# Patient Record
Sex: FEMALE | Race: ASIAN | NOT HISPANIC OR LATINO
[De-identification: names, ages, dates, MRNs, and addresses within clinical notes are randomized per-mention and may not be internally consistent; named-entity substitution may affect disease eponyms.]

---

## 2020-01-09 ENCOUNTER — APPOINTMENT (OUTPATIENT)
Dept: RHEUMATOLOGY | Facility: CLINIC | Age: 30
End: 2020-01-09
Payer: COMMERCIAL

## 2020-01-09 VITALS
WEIGHT: 159 LBS | DIASTOLIC BLOOD PRESSURE: 78 MMHG | BODY MASS INDEX: 34.3 KG/M2 | HEART RATE: 93 BPM | TEMPERATURE: 98.3 F | SYSTOLIC BLOOD PRESSURE: 118 MMHG | HEIGHT: 57 IN | OXYGEN SATURATION: 96 %

## 2020-01-09 DIAGNOSIS — Z83.3 FAMILY HISTORY OF DIABETES MELLITUS: ICD-10-CM

## 2020-01-09 PROBLEM — Z00.00 ENCOUNTER FOR PREVENTIVE HEALTH EXAMINATION: Status: ACTIVE | Noted: 2020-01-09

## 2020-01-09 PROCEDURE — 99213 OFFICE O/P EST LOW 20 MIN: CPT

## 2020-01-09 NOTE — HISTORY OF PRESENT ILLNESS
[FreeTextEntry1] : 29 year old woman with long standing history of lupus, lupus nephritis, followed by nephrology at [facility] \par Patient feeling well \par Last visit with nephrologist in November 2019, labs reviewed on phone\par No changes in medications at last visit.  \par \par Vitals reviewed and stable\par No recent infections, feeling well\par No rashes, no alopecia,\par Exercising, going to the gym about 3-4 times per week, near work\par Received flu vaccine at work\par No chest pain or shortness of breath\par no peripheral edema\par No Raynaud's\par Ophthalmology up to date, plaquenil 200 mg qday.

## 2020-01-09 NOTE — DATA REVIEWED
[FreeTextEntry1] : Reviewed on phone\par \par 12/3/19\par MONET 1:80\par DsDNA negative\par UA protein negative\par RBC 3\par Iron 92, ferritin 36\par Creatinine 0.41\par WBC: 7.3\par Hgb: 13.9, HCT: 41.3\par Platelet: 423

## 2020-01-09 NOTE — ASSESSMENT
[FreeTextEntry1] : 29 year old woman with longstanding history of lupus with lupus nephritis, followed by nephrologist at the [facility].  Patient currently doing well, recent lab results reviewed on patient's phone. Continue exercise as well.  Will continue plaquenil 200 mg qday, will continue to follow up with ophthalmology given long term Plaquenil use. Will request previous records as well.

## 2020-01-09 NOTE — PHYSICAL EXAM
[General Appearance - Alert] : alert [General Appearance - In No Acute Distress] : in no acute distress [General Appearance - Well Nourished] : well nourished [General Appearance - Well Developed] : well developed [Sclera] : the sclera and conjunctiva were normal [Outer Ear] : the ears and nose were normal in appearance [Examination Of The Oral Cavity] : the lips and gums were normal [Oropharynx] : the oropharynx was normal [Respiration, Rhythm And Depth] : normal respiratory rhythm and effort [Exaggerated Use Of Accessory Muscles For Inspiration] : no accessory muscle use [Heart Rate And Rhythm] : heart rate was normal and rhythm regular [Auscultation Breath Sounds / Voice Sounds] : lungs were clear to auscultation bilaterally [Heart Sounds] : normal S1 and S2 [Veins - Varicosity Changes] : there were no varicosital changes [Edema] : there was no peripheral edema [Abnormal Walk] : normal gait [Musculoskeletal - Swelling] : no joint swelling seen [Nail Clubbing] : no clubbing  or cyanosis of the fingernails [] : no rash [Skin Color & Pigmentation] : normal skin color and pigmentation [Motor Tone] : muscle strength and tone were normal [Oriented To Time, Place, And Person] : oriented to person, place, and time [Skin Lesions] : no skin lesions [Affect] : the affect was normal [Impaired Insight] : insight and judgment were intact [Mood] : the mood was normal

## 2020-04-28 ENCOUNTER — RX RENEWAL (OUTPATIENT)
Age: 30
End: 2020-04-28

## 2020-06-24 ENCOUNTER — APPOINTMENT (OUTPATIENT)
Dept: RHEUMATOLOGY | Facility: CLINIC | Age: 30
End: 2020-06-24
Payer: COMMERCIAL

## 2020-06-24 VITALS
HEIGHT: 57 IN | DIASTOLIC BLOOD PRESSURE: 84 MMHG | WEIGHT: 155.19 LBS | HEART RATE: 98 BPM | BODY MASS INDEX: 33.48 KG/M2 | TEMPERATURE: 98.5 F | OXYGEN SATURATION: 96 % | SYSTOLIC BLOOD PRESSURE: 129 MMHG

## 2020-06-24 PROCEDURE — 99213 OFFICE O/P EST LOW 20 MIN: CPT | Mod: 25

## 2020-06-24 PROCEDURE — 36415 COLL VENOUS BLD VENIPUNCTURE: CPT

## 2020-06-24 NOTE — HISTORY OF PRESENT ILLNESS
[FreeTextEntry1] : 30 year old woman with long standing history of lupus, lupus nephritis, followed by nephrology at the [facility] at [place] \par Patient feeling well \par Last visit with nephrologist rescheduled due to current coronavirus pandemic.  She has appointment scheduled for next month. \par No changes in medications since last visit.  \par \par Vitals reviewed and stable\par No recent infections, feeling well.  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

## 2020-06-24 NOTE — PHYSICAL EXAM
[General Appearance - Alert] : alert [General Appearance - In No Acute Distress] : in no acute distress [General Appearance - Well-Appearing] : healthy appearing [General Appearance - Well Developed] : well developed [Respiration, Rhythm And Depth] : normal respiratory rhythm and effort [Sclera] : the sclera and conjunctiva were normal [Exaggerated Use Of Accessory Muscles For Inspiration] : no accessory muscle use [Edema] : there was no peripheral edema [Veins - Varicosity Changes] : there were no varicosital changes [Nail Clubbing] : no clubbing  or cyanosis of the fingernails [Musculoskeletal - Swelling] : no joint swelling seen [Abnormal Walk] : normal gait [Skin Color & Pigmentation] : normal skin color and pigmentation [Skin Lesions] : no skin lesions [] : no rash [Oriented To Time, Place, And Person] : oriented to person, place, and time [Affect] : the affect was normal [Impaired Insight] : insight and judgment were intact

## 2020-06-24 NOTE — ASSESSMENT
[FreeTextEntry1] : 30 year old woman with longstanding history of lupus with lupus nephritis, followed by nephrologist at the [facility] at [facility].  Patient currently doing well, will follow up with nephrologist next month as rescheduled appointment due to current coronavirus pandemic.  Continue exercise as well.  Will continue plaquenil 200 mg qday, patient will make follow up with ophthalmology given long term Plaquenil use.  Check labs today.

## 2020-06-25 LAB
ALBUMIN SERPL ELPH-MCNC: 4.2 G/DL
ALP BLD-CCNC: 93 U/L
ALT SERPL-CCNC: 16 U/L
ANION GAP SERPL CALC-SCNC: 13 MMOL/L
AST SERPL-CCNC: 15 U/L
BASOPHILS # BLD AUTO: 0.07 K/UL
BASOPHILS NFR BLD AUTO: 0.9 %
BILIRUB SERPL-MCNC: 0.2 MG/DL
BUN SERPL-MCNC: 10 MG/DL
C3 SERPL-MCNC: 125 MG/DL
C4 SERPL-MCNC: 33 MG/DL
CALCIUM SERPL-MCNC: 9.9 MG/DL
CHLORIDE SERPL-SCNC: 104 MMOL/L
CO2 SERPL-SCNC: 23 MMOL/L
CREAT SERPL-MCNC: 0.47 MG/DL
EOSINOPHIL # BLD AUTO: 0.51 K/UL
EOSINOPHIL NFR BLD AUTO: 6.8 %
GLUCOSE SERPL-MCNC: 112 MG/DL
HCT VFR BLD CALC: 41.2 %
HGB BLD-MCNC: 12.5 G/DL
IMM GRANULOCYTES NFR BLD AUTO: 0.5 %
LYMPHOCYTES # BLD AUTO: 1.13 K/UL
LYMPHOCYTES NFR BLD AUTO: 15 %
MAN DIFF?: NORMAL
MCHC RBC-ENTMCNC: 26.9 PG
MCHC RBC-ENTMCNC: 30.3 GM/DL
MCV RBC AUTO: 88.8 FL
MONOCYTES # BLD AUTO: 0.88 K/UL
MONOCYTES NFR BLD AUTO: 11.7 %
NEUTROPHILS # BLD AUTO: 4.88 K/UL
NEUTROPHILS NFR BLD AUTO: 65.1 %
PLATELET # BLD AUTO: 471 K/UL
POTASSIUM SERPL-SCNC: 4.1 MMOL/L
PROT SERPL-MCNC: 6.9 G/DL
RBC # BLD: 4.64 M/UL
RBC # FLD: 14 %
SODIUM SERPL-SCNC: 140 MMOL/L
WBC # FLD AUTO: 7.51 K/UL

## 2020-06-26 ENCOUNTER — NON-APPOINTMENT (OUTPATIENT)
Age: 30
End: 2020-06-26

## 2020-06-29 LAB — DSDNA AB SER-ACNC: 88 IU/ML

## 2020-12-16 ENCOUNTER — APPOINTMENT (OUTPATIENT)
Dept: RHEUMATOLOGY | Facility: CLINIC | Age: 30
End: 2020-12-16
Payer: COMMERCIAL

## 2020-12-16 VITALS
TEMPERATURE: 98.3 F | OXYGEN SATURATION: 100 % | HEIGHT: 57 IN | DIASTOLIC BLOOD PRESSURE: 84 MMHG | SYSTOLIC BLOOD PRESSURE: 127 MMHG | WEIGHT: 155 LBS | HEART RATE: 84 BPM | BODY MASS INDEX: 33.44 KG/M2

## 2020-12-16 PROCEDURE — 99072 ADDL SUPL MATRL&STAF TM PHE: CPT

## 2020-12-16 PROCEDURE — 36415 COLL VENOUS BLD VENIPUNCTURE: CPT

## 2020-12-16 PROCEDURE — 99213 OFFICE O/P EST LOW 20 MIN: CPT | Mod: 25

## 2020-12-16 NOTE — ASSESSMENT
[FreeTextEntry1] : 30 year old woman with longstanding history of lupus with lupus nephritis, followed by nephrologist at the [facility] at [facility].  Patient currently doing well, will follow up with nephrologist February 2021.  Blood pressure controlled, continue losartan 50 mg qday.  Will check labs today.  Patient will continue plaquenil 200 mg qday and will make follow up with ophthalmology given long term Plaquenil use.  Will increase exercise as well.

## 2020-12-16 NOTE — PHYSICAL EXAM
[General Appearance - Alert] : alert [General Appearance - In No Acute Distress] : in no acute distress [General Appearance - Well-Appearing] : healthy appearing [Sclera] : the sclera and conjunctiva were normal [Respiration, Rhythm And Depth] : normal respiratory rhythm and effort [Exaggerated Use Of Accessory Muscles For Inspiration] : no accessory muscle use [Heart Rate And Rhythm] : heart rate was normal and rhythm regular [Heart Sounds] : normal S1 and S2 [Edema] : there was no peripheral edema [Skin Color & Pigmentation] : normal skin color and pigmentation [] : no rash [Skin Lesions] : no skin lesions [Oriented To Time, Place, And Person] : oriented to person, place, and time [Impaired Insight] : insight and judgment were intact [Affect] : the affect was normal [Mood] : the mood was normal [FreeTextEntry1] : No Raynauds

## 2020-12-16 NOTE — HISTORY OF PRESENT ILLNESS
[FreeTextEntry1] : 30 year old woman with long standing history of lupus, lupus nephritis, followed by nephrology at the [facility] at [place] \par Patient feeling well \par Last visit with nephrologist rescheduled due to current coronavirus pandemic.  She has appointment scheduled for next month. \par No changes in medications since last visit.  \par \par Vitals reviewed and stable\par No recent infections, feeling well.  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.\par No rashes, no alopecia.\par Exercising at home, walking for exercise but limited outside due to concern for risk of infection.\par \par No chest pain or shortness of breath\par no peripheral edema\par No Raynaud's\par Ophthalmology 11/18, will schedule follow up when current conditions allow, \par Continues plaquenil 200 mg qday, did not have issues with obtaining medication, has not missed doses.\par \par December 16, 2020\par Patient returns for follow up \par Patient feeling well\par No new symptoms\par Takes Vit D, C, iron\par No changes in medications since last visit\par No chest pain, shortness of breath, no peripheral edema\par No rashes, no Raynaud's\par No recent infections\par Not walking as much but will try to restart\par Walking less at work due wearing the mask

## 2020-12-21 LAB
C3 SERPL-MCNC: 145 MG/DL
C4 SERPL-MCNC: 36 MG/DL

## 2020-12-22 ENCOUNTER — TRANSCRIPTION ENCOUNTER (OUTPATIENT)
Age: 30
End: 2020-12-22

## 2020-12-22 LAB
ALBUMIN SERPL ELPH-MCNC: 4.7 G/DL
ALP BLD-CCNC: 89 U/L
ALT SERPL-CCNC: 18 U/L
ANION GAP SERPL CALC-SCNC: 12 MMOL/L
APPEARANCE: CLEAR
AST SERPL-CCNC: 14 U/L
BACTERIA: NEGATIVE
BASOPHILS # BLD AUTO: 0.07 K/UL
BASOPHILS NFR BLD AUTO: 1 %
BILIRUB SERPL-MCNC: 0.2 MG/DL
BILIRUBIN URINE: NEGATIVE
BLOOD URINE: ABNORMAL
BUN SERPL-MCNC: 9 MG/DL
CALCIUM SERPL-MCNC: 10.8 MG/DL
CHLORIDE SERPL-SCNC: 105 MMOL/L
CO2 SERPL-SCNC: 24 MMOL/L
COLOR: COLORLESS
CREAT SERPL-MCNC: 0.51 MG/DL
CREAT SPEC-SCNC: 7 MG/DL
CREAT/PROT UR: 2 RATIO
DSDNA AB SER-ACNC: 109 IU/ML
EOSINOPHIL # BLD AUTO: 0.54 K/UL
EOSINOPHIL NFR BLD AUTO: 7.4 %
GLUCOSE QUALITATIVE U: NEGATIVE
GLUCOSE SERPL-MCNC: 84 MG/DL
HCT VFR BLD CALC: 42.4 %
HGB BLD-MCNC: 13.3 G/DL
HYALINE CASTS: 1 /LPF
IMM GRANULOCYTES NFR BLD AUTO: 0.4 %
KETONES URINE: NEGATIVE
LEUKOCYTE ESTERASE URINE: ABNORMAL
LYMPHOCYTES # BLD AUTO: 1.15 K/UL
LYMPHOCYTES NFR BLD AUTO: 15.8 %
MAN DIFF?: NORMAL
MCHC RBC-ENTMCNC: 26.8 PG
MCHC RBC-ENTMCNC: 31.4 GM/DL
MCV RBC AUTO: 85.3 FL
MICROSCOPIC-UA: NORMAL
MONOCYTES # BLD AUTO: 1.06 K/UL
MONOCYTES NFR BLD AUTO: 14.6 %
NEUTROPHILS # BLD AUTO: 4.43 K/UL
NEUTROPHILS NFR BLD AUTO: 60.8 %
NITRITE URINE: NEGATIVE
PH URINE: 7.5
PLATELET # BLD AUTO: 500 K/UL
POTASSIUM SERPL-SCNC: 4.6 MMOL/L
PROT SERPL-MCNC: 7.7 G/DL
PROT UR-MCNC: 15 MG/DL
PROTEIN URINE: NEGATIVE
RBC # BLD: 4.97 M/UL
RBC # FLD: 13.7 %
RED BLOOD CELLS URINE: 12 /HPF
SODIUM SERPL-SCNC: 141 MMOL/L
SPECIFIC GRAVITY URINE: 1
SQUAMOUS EPITHELIAL CELLS: 0 /HPF
URINE COMMENTS: NORMAL
UROBILINOGEN URINE: NORMAL
WBC # FLD AUTO: 7.28 K/UL
WHITE BLOOD CELLS URINE: 75 /HPF

## 2021-01-05 ENCOUNTER — RX RENEWAL (OUTPATIENT)
Age: 31
End: 2021-01-05

## 2021-06-16 ENCOUNTER — APPOINTMENT (OUTPATIENT)
Dept: RHEUMATOLOGY | Facility: CLINIC | Age: 31
End: 2021-06-16

## 2021-06-17 ENCOUNTER — LABORATORY RESULT (OUTPATIENT)
Age: 31
End: 2021-06-17

## 2021-06-17 ENCOUNTER — APPOINTMENT (OUTPATIENT)
Dept: RHEUMATOLOGY | Facility: CLINIC | Age: 31
End: 2021-06-17
Payer: COMMERCIAL

## 2021-06-17 VITALS
BODY MASS INDEX: 33.76 KG/M2 | HEIGHT: 57 IN | HEART RATE: 93 BPM | WEIGHT: 156.5 LBS | SYSTOLIC BLOOD PRESSURE: 126 MMHG | TEMPERATURE: 98.2 F | OXYGEN SATURATION: 100 % | DIASTOLIC BLOOD PRESSURE: 80 MMHG

## 2021-06-17 PROCEDURE — 99072 ADDL SUPL MATRL&STAF TM PHE: CPT

## 2021-06-17 PROCEDURE — 99214 OFFICE O/P EST MOD 30 MIN: CPT | Mod: 25

## 2021-06-17 PROCEDURE — 36415 COLL VENOUS BLD VENIPUNCTURE: CPT

## 2021-06-17 NOTE — PHYSICAL EXAM
[General Appearance - Alert] : alert [General Appearance - In No Acute Distress] : in no acute distress [General Appearance - Well-Appearing] : healthy appearing [Sclera] : the sclera and conjunctiva were normal [Respiration, Rhythm And Depth] : normal respiratory rhythm and effort [Exaggerated Use Of Accessory Muscles For Inspiration] : no accessory muscle use [Edema] : there was no peripheral edema [Nail Clubbing] : no clubbing  or cyanosis of the fingernails [Abnormal Walk] : normal gait [Musculoskeletal - Swelling] : no joint swelling seen [Motor Tone] : muscle strength and tone were normal [Skin Color & Pigmentation] : normal skin color and pigmentation [Skin Turgor] : normal skin turgor [] : no rash [Skin Lesions] : no skin lesions [Oriented To Time, Place, And Person] : oriented to person, place, and time [Impaired Insight] : insight and judgment were intact [Affect] : the affect was normal [Mood] : the mood was normal [FreeTextEntry1] : No active synovitis of the upper and lower extremities bilaterally.

## 2021-06-17 NOTE — ASSESSMENT
[FreeTextEntry1] : 31 year old woman with longstanding history of lupus with lupus nephritis, followed by nephrologist at the [facility] at [facility].  Patient currently doing well, will follow up with nephrologist August 2021.  Blood pressure controlled, continue losartan 50 mg qday.  Will check labs today in office.  Patient will continue plaquenil 200 mg qday and is up to date on ophthalmology follow up, last visit May 202 1, will follow up with ophthalmology given long term Plaquenil use.  Sunscreen use discussed in addition to increasing exercise as well.  Will check vitamin d level today.

## 2021-06-17 NOTE — HISTORY OF PRESENT ILLNESS
[FreeTextEntry1] : 31 year old woman with long standing history of lupus, lupus nephritis, followed by nephrology at the [facility] at [place] \par Patient feeling well \par Last visit with nephrologist rescheduled due to current coronavirus pandemic.  She has appointment scheduled for next month. \par No changes in medications since last visit.  \par \par Vitals reviewed and stable\par No recent infections, feeling well.  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.\par No rashes, no alopecia.\par Exercising at home, walking for exercise but limited outside due to concern for risk of infection.\par \par No chest pain or shortness of breath\par no peripheral edema\par No Raynaud's\par Ophthalmology 11/18, will schedule follow up when current conditions allow, \par Continues plaquenil 200 mg qday, did not have issues with obtaining medication, has not missed doses.\par \par December 16, 2020\par Patient returns for follow up \par Patient feeling well\par No new symptoms\par Takes Vit D, C, iron\par No changes in medications since last visit\par No chest pain, shortness of breath, no peripheral edema\par No rashes, no Raynaud's\par No recent infections\par Not walking as much but will try to restart\par Walking less at work due wearing the mask\par \par June 17, 2021\par Patient feeling well\par Completed covid vaccine x 2, Moderna, March 24, 2021\par Parents completed vaccine as well in April 2021\par Had follow up with ophthalmology May 2021, no effects of plaquenil noted\par No changes in medications since last visit, overall feeling well\par Still exercising, walking, and you tube videos\par No chest pain or shortness of breath\par No joint pains\par No rashes\par +sunscreen use\par No Raynauds\par Taking vitamins C, D, and Iron as well\par No peripheral edema \par

## 2021-06-18 LAB
25(OH)D3 SERPL-MCNC: 21.2 NG/ML
ALBUMIN SERPL ELPH-MCNC: 4.3 G/DL
ALP BLD-CCNC: 78 U/L
ALT SERPL-CCNC: 13 U/L
ANION GAP SERPL CALC-SCNC: 11 MMOL/L
APPEARANCE: CLEAR
AST SERPL-CCNC: 15 U/L
BASOPHILS # BLD AUTO: 0.08 K/UL
BASOPHILS NFR BLD AUTO: 1 %
BILIRUB SERPL-MCNC: 0.2 MG/DL
BILIRUBIN URINE: NEGATIVE
BLOOD URINE: ABNORMAL
BUN SERPL-MCNC: 11 MG/DL
C3 SERPL-MCNC: 138 MG/DL
C4 SERPL-MCNC: 37 MG/DL
CALCIUM SERPL-MCNC: 9.8 MG/DL
CHLORIDE SERPL-SCNC: 104 MMOL/L
CO2 SERPL-SCNC: 25 MMOL/L
COLOR: COLORLESS
CREAT SERPL-MCNC: 0.47 MG/DL
CREAT SPEC-SCNC: 20 MG/DL
CREAT/PROT UR: 1.5 RATIO
EOSINOPHIL # BLD AUTO: 0.46 K/UL
EOSINOPHIL NFR BLD AUTO: 5.6 %
GLUCOSE QUALITATIVE U: NEGATIVE
GLUCOSE SERPL-MCNC: 110 MG/DL
HCT VFR BLD CALC: 39.8 %
HGB BLD-MCNC: 12.1 G/DL
IMM GRANULOCYTES NFR BLD AUTO: 1.5 %
KETONES URINE: NEGATIVE
LEUKOCYTE ESTERASE URINE: ABNORMAL
LYMPHOCYTES # BLD AUTO: 1.06 K/UL
LYMPHOCYTES NFR BLD AUTO: 12.8 %
MAN DIFF?: NORMAL
MCHC RBC-ENTMCNC: 27.5 PG
MCHC RBC-ENTMCNC: 30.4 GM/DL
MCV RBC AUTO: 90.5 FL
MONOCYTES # BLD AUTO: 0.92 K/UL
MONOCYTES NFR BLD AUTO: 11.1 %
NEUTROPHILS # BLD AUTO: 5.63 K/UL
NEUTROPHILS NFR BLD AUTO: 68 %
NITRITE URINE: NEGATIVE
PH URINE: 7
PLATELET # BLD AUTO: 513 K/UL
POTASSIUM SERPL-SCNC: 4.3 MMOL/L
PROT SERPL-MCNC: 7.1 G/DL
PROT UR-MCNC: 30 MG/DL
PROTEIN URINE: ABNORMAL
RBC # BLD: 4.4 M/UL
RBC # FLD: 14.4 %
SODIUM SERPL-SCNC: 140 MMOL/L
SPECIFIC GRAVITY URINE: 1.01
UROBILINOGEN URINE: NORMAL
WBC # FLD AUTO: 8.27 K/UL

## 2021-06-21 LAB — DSDNA AB SER-ACNC: 115 IU/ML

## 2021-12-16 ENCOUNTER — LABORATORY RESULT (OUTPATIENT)
Age: 31
End: 2021-12-16

## 2021-12-16 ENCOUNTER — APPOINTMENT (OUTPATIENT)
Dept: RHEUMATOLOGY | Facility: CLINIC | Age: 31
End: 2021-12-16
Payer: COMMERCIAL

## 2021-12-16 VITALS
TEMPERATURE: 98.3 F | HEIGHT: 57 IN | WEIGHT: 153 LBS | HEART RATE: 105 BPM | DIASTOLIC BLOOD PRESSURE: 87 MMHG | OXYGEN SATURATION: 99 % | SYSTOLIC BLOOD PRESSURE: 130 MMHG | BODY MASS INDEX: 33.01 KG/M2

## 2021-12-16 DIAGNOSIS — Z13.220 ENCOUNTER FOR SCREENING FOR LIPOID DISORDERS: ICD-10-CM

## 2021-12-16 PROCEDURE — 99214 OFFICE O/P EST MOD 30 MIN: CPT | Mod: 25

## 2021-12-16 PROCEDURE — 36415 COLL VENOUS BLD VENIPUNCTURE: CPT

## 2021-12-16 NOTE — HISTORY OF PRESENT ILLNESS
[FreeTextEntry1] : 31 year old woman with long standing history of lupus, lupus nephritis, followed by nephrology at the [facility] at [place] \par \par June 24, 2020\par Patient feeling well \par Last visit with nephrologist rescheduled due to current coronavirus pandemic.  She has appointment scheduled for next month. \par No changes in medications since last visit.  \par \par Vitals reviewed and stable\par No recent infections, feeling well.  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.\par No rashes, no alopecia.\par Exercising at home, walking for exercise but limited outside due to concern for risk of infection.\par \par No chest pain or shortness of breath\par no peripheral edema\par No Raynaud's\par Ophthalmology 11/18, will schedule follow up when current conditions allow, \par Continues plaquenil 200 mg qday, did not have issues with obtaining medication, has not missed doses.\par \par December 16, 2020\par Patient returns for follow up \par Patient feeling well\par No new symptoms\par Takes Vit D, C, iron\par No changes in medications since last visit\par No chest pain, shortness of breath, no peripheral edema\par No rashes, no Raynaud's\par No recent infections\par Not walking as much but will try to restart\par Walking less at work due wearing the mask\par \par June 17, 2021\par Patient feeling well\par Completed covid vaccine x 2, Moderna, March 24, 2021\par Parents completed vaccine as well in April 2021\par Had follow up with ophthalmology May 2021, no effects of plaquenil noted\par No changes in medications since last visit, overall feeling well\par Still exercising, walking, and you tube videos\par No chest pain or shortness of breath\par No joint pains\par No rashes\par +sunscreen use\par No Raynauds\par Taking vitamins C, D, and Iron as well\par No peripheral edema \par \par December 16, 2021\par Patient returns for follow up, feeling well\par Completed flu shot vaccine\par Completed covid vaccine x 2 doses in march 2021, discussed covid booster vaccine\par Completed follow up ophthalmology June 6, 2021\par Taking Vitamin C, D, iron supplement\par Doubled the dose of vitamin d as low at last visit, vitamin d 2000 units per day\par No joint pains or swelling, no stiffness\par No peripheral edema\par No rashes, no oral ulcers\par Overall feeling well, will have follow up with nephrologist in February

## 2021-12-16 NOTE — ASSESSMENT
[FreeTextEntry1] : 31 year old woman with longstanding history of lupus with lupus nephritis, followed by nephrologist at the [facility] at [facility].  Patient currently doing well, will follow up with nephrologist February 2022.  Blood pressure controlled, continue losartan 50 mg qday. Will check labs today in office.  Patient will continue plaquenil 200 mg qday and is up to date on ophthalmology follow up, last visit June 2021,will continue to follow up with ophthalmology as recommended given long term Plaquenil use. Continues increased dose of vitamin D since last visit as well.  Patient will obtain covid booster at work as well. Patient will follow up with nephrologist in February 2022.

## 2021-12-16 NOTE — PHYSICAL EXAM
[General Appearance - Alert] : alert [General Appearance - In No Acute Distress] : in no acute distress [General Appearance - Well Nourished] : well nourished [General Appearance - Well Developed] : well developed [General Appearance - Well-Appearing] : healthy appearing [Sclera] : the sclera and conjunctiva were normal [Respiration, Rhythm And Depth] : normal respiratory rhythm and effort [Exaggerated Use Of Accessory Muscles For Inspiration] : no accessory muscle use [Edema] : there was no peripheral edema [No Spinal Tenderness] : no spinal tenderness [Abnormal Walk] : normal gait [Nail Clubbing] : no clubbing  or cyanosis of the fingernails [Musculoskeletal - Swelling] : no joint swelling seen [Motor Tone] : muscle strength and tone were normal [] : no rash [Skin Lesions] : no skin lesions [Oriented To Time, Place, And Person] : oriented to person, place, and time [Impaired Insight] : insight and judgment were intact [Affect] : the affect was normal [Mood] : the mood was normal [FreeTextEntry1] : No active synovitis of the upper and lower extremities bilaterally.

## 2021-12-17 ENCOUNTER — NON-APPOINTMENT (OUTPATIENT)
Age: 31
End: 2021-12-17

## 2021-12-17 LAB
ALBUMIN SERPL ELPH-MCNC: 4.4 G/DL
ALP BLD-CCNC: 90 U/L
ALT SERPL-CCNC: 14 U/L
ANION GAP SERPL CALC-SCNC: 15 MMOL/L
APPEARANCE: CLEAR
AST SERPL-CCNC: 12 U/L
BASOPHILS # BLD AUTO: 0.09 K/UL
BASOPHILS NFR BLD AUTO: 0.9 %
BILIRUB SERPL-MCNC: 0.2 MG/DL
BILIRUBIN URINE: NEGATIVE
BLOOD URINE: ABNORMAL
BUN SERPL-MCNC: 10 MG/DL
C3 SERPL-MCNC: 160 MG/DL
C4 SERPL-MCNC: 44 MG/DL
CALCIUM SERPL-MCNC: 9.7 MG/DL
CHLORIDE SERPL-SCNC: 104 MMOL/L
CHOLEST SERPL-MCNC: 222 MG/DL
CO2 SERPL-SCNC: 22 MMOL/L
COLOR: COLORLESS
CREAT SERPL-MCNC: 0.49 MG/DL
CREAT SPEC-SCNC: 12 MG/DL
CREAT/PROT UR: 1.1 RATIO
EOSINOPHIL # BLD AUTO: 0.57 K/UL
EOSINOPHIL NFR BLD AUTO: 5.9 %
GLUCOSE QUALITATIVE U: NEGATIVE
GLUCOSE SERPL-MCNC: 78 MG/DL
HCT VFR BLD CALC: 34.1 %
HDLC SERPL-MCNC: 54 MG/DL
HGB BLD-MCNC: 10.2 G/DL
IMM GRANULOCYTES NFR BLD AUTO: 1.3 %
KETONES URINE: NEGATIVE
LDLC SERPL CALC-MCNC: 137 MG/DL
LEUKOCYTE ESTERASE URINE: ABNORMAL
LYMPHOCYTES # BLD AUTO: 0.98 K/UL
LYMPHOCYTES NFR BLD AUTO: 10.1 %
MAN DIFF?: NORMAL
MCHC RBC-ENTMCNC: 26 PG
MCHC RBC-ENTMCNC: 29.9 GM/DL
MCV RBC AUTO: 87 FL
MONOCYTES # BLD AUTO: 0.85 K/UL
MONOCYTES NFR BLD AUTO: 8.8 %
NEUTROPHILS # BLD AUTO: 7.04 K/UL
NEUTROPHILS NFR BLD AUTO: 73 %
NITRITE URINE: NEGATIVE
NONHDLC SERPL-MCNC: 168 MG/DL
PH URINE: 7
PLATELET # BLD AUTO: 632 K/UL
POTASSIUM SERPL-SCNC: 4.3 MMOL/L
PROT SERPL-MCNC: 7 G/DL
PROT UR-MCNC: 14 MG/DL
PROTEIN URINE: NORMAL
RBC # BLD: 3.92 M/UL
RBC # FLD: 14.8 %
SODIUM SERPL-SCNC: 141 MMOL/L
SPECIFIC GRAVITY URINE: 1
TRIGL SERPL-MCNC: 152 MG/DL
UROBILINOGEN URINE: NORMAL
WBC # FLD AUTO: 9.66 K/UL

## 2021-12-21 LAB — DSDNA AB SER-ACNC: 54 IU/ML

## 2022-04-12 ENCOUNTER — NON-APPOINTMENT (OUTPATIENT)
Age: 32
End: 2022-04-12

## 2022-06-16 ENCOUNTER — LABORATORY RESULT (OUTPATIENT)
Age: 32
End: 2022-06-16

## 2022-06-16 ENCOUNTER — APPOINTMENT (OUTPATIENT)
Dept: RHEUMATOLOGY | Facility: CLINIC | Age: 32
End: 2022-06-16
Payer: COMMERCIAL

## 2022-06-16 VITALS
DIASTOLIC BLOOD PRESSURE: 82 MMHG | BODY MASS INDEX: 32.63 KG/M2 | HEART RATE: 96 BPM | OXYGEN SATURATION: 97 % | TEMPERATURE: 98.2 F | SYSTOLIC BLOOD PRESSURE: 122 MMHG | WEIGHT: 151.25 LBS | HEIGHT: 57 IN

## 2022-06-16 DIAGNOSIS — C54.1 MALIGNANT NEOPLASM OF ENDOMETRIUM: ICD-10-CM

## 2022-06-16 PROCEDURE — 99214 OFFICE O/P EST MOD 30 MIN: CPT | Mod: 25

## 2022-06-16 RX ORDER — PROGESTERONE 200 MG/1
200 CAPSULE ORAL
Qty: 30 | Refills: 0 | Status: COMPLETED | COMMUNITY
Start: 2022-03-05

## 2022-06-16 NOTE — ASSESSMENT
[FreeTextEntry1] : 32 year old woman with longstanding history of lupus with lupus nephritis, followed by nephrologist at the [facility], follow up pending August 2022.  Patient currently doing well.  Since last visit, patient diagnosed with endometrial carcinoma, following at [facility]. Blood pressure well controlled, continue losartan 50 mg qday. Will check labs today in office. Patient will continue plaquenil 200 mg qday, up to date with ophthalmology follow up, continue close follow up with ophthalmology as recommended given long term Plaquenil use.  Patient will follow up in 6 months or sooner as needed

## 2022-06-16 NOTE — PHYSICAL EXAM
[General Appearance - Alert] : alert [General Appearance - In No Acute Distress] : in no acute distress [General Appearance - Well Nourished] : well nourished [General Appearance - Well Developed] : well developed [General Appearance - Well-Appearing] : healthy appearing [Sclera] : the sclera and conjunctiva were normal [Examination Of The Oral Cavity] : the lips and gums were normal [Oropharynx] : the oropharynx was normal [Respiration, Rhythm And Depth] : normal respiratory rhythm and effort [Exaggerated Use Of Accessory Muscles For Inspiration] : no accessory muscle use [Auscultation Breath Sounds / Voice Sounds] : lungs were clear to auscultation bilaterally [Heart Rate And Rhythm] : heart rate was normal and rhythm regular [Heart Sounds] : normal S1 and S2 [Edema] : there was no peripheral edema [Abnormal Walk] : normal gait [Nail Clubbing] : no clubbing  or cyanosis of the fingernails [Musculoskeletal - Swelling] : no joint swelling seen [Motor Tone] : muscle strength and tone were normal [] : no rash [Skin Lesions] : no skin lesions [Oriented To Time, Place, And Person] : oriented to person, place, and time [Impaired Insight] : insight and judgment were intact [Affect] : the affect was normal [Mood] : the mood was normal [FreeTextEntry1] : No active synovitis.  Muscle strength intact in all 4 extremities

## 2022-06-16 NOTE — DATA REVIEWED
[FreeTextEntry1] : MRI report from April 27, 2022 reviewed with patient:\par Enhancement throughout the endometrium, compatible with known endometrial neoplasm no visible gross myometrial invasion.\par \par Pathology from March 29, 2022:\par Endometrial carcinoma grade 1 with squamous differentiation

## 2022-06-16 NOTE — HISTORY OF PRESENT ILLNESS
[FreeTextEntry1] : 32 year old woman with long standing history of lupus, lupus nephritis, followed by nephrology at the [facility] at [place] \par \par June 24, 2020\par Patient feeling well \par Last visit with nephrologist rescheduled due to current coronavirus pandemic.  She has appointment scheduled for next month. \par No changes in medications since last visit.  \par \par Vitals reviewed and stable\par No recent infections, feeling well.  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.\par No rashes, no alopecia.\par Exercising at home, walking for exercise but limited outside due to concern for risk of infection.\par \par No chest pain or shortness of breath\par no peripheral edema\par No Raynaud's\par Ophthalmology 11/18, will schedule follow up when current conditions allow, \par Continues plaquenil 200 mg qday, did not have issues with obtaining medication, has not missed doses.\par \par December 16, 2020\par Patient returns for follow up \par Patient feeling well\par No new symptoms\par Takes Vit D, C, iron\par No changes in medications since last visit\par No chest pain, shortness of breath, no peripheral edema\par No rashes, no Raynaud's\par No recent infections\par Not walking as much but will try to restart\par Walking less at work due wearing the mask\par \par June 17, 2021\par Patient feeling well\par Completed covid vaccine x 2, Moderna, March 24, 2021\par Parents completed vaccine as well in April 2021\par Had follow up with ophthalmology May 2021, no effects of plaquenil noted\par No changes in medications since last visit, overall feeling well\par Still exercising, walking, and you tube videos\par No chest pain or shortness of breath\par No joint pains\par No rashes\par +sunscreen use\par No Raynauds\par Taking vitamins C, D, and Iron as well\par No peripheral edema \par \par December 16, 2021\par Patient returns for follow up, feeling well\par Completed flu shot vaccine\par Completed covid vaccine x 2 doses in march 2021, discussed covid booster vaccine\par Completed follow up ophthalmology June 6, 2021\par Taking Vitamin C, D, iron supplement\par Doubled the dose of vitamin d as low at last visit, vitamin d 2000 units per day\par No joint pains or swelling, no stiffness\par No peripheral edema\par No rashes, no oral ulcers\par Overall feeling well, will have follow up with nephrologist in February\par \par June 16, 2022\par Patient returns for follow up\par Since last visit, patient has been diagnosed with endometrial carcinoma grade 1 following with gynecology oncology at [facility].\par Had IUD placed two days ago, progestin only, donn\par Following every six months with GYN oncology\par Was seen by nephrologist in February 2022, no changes in medications, has follow-up in August\par Feeling well, no joint pains or swelling, no stiffness, no peripheral edema, no rashes, no oral ulcers,\par No chest pain or shortness of breath.\par \par

## 2022-06-17 ENCOUNTER — NON-APPOINTMENT (OUTPATIENT)
Age: 32
End: 2022-06-17

## 2022-06-17 ENCOUNTER — LABORATORY RESULT (OUTPATIENT)
Age: 32
End: 2022-06-17

## 2022-06-17 LAB
ALBUMIN SERPL ELPH-MCNC: 4.6 G/DL
ALP BLD-CCNC: 97 U/L
ALT SERPL-CCNC: 16 U/L
ANION GAP SERPL CALC-SCNC: 13 MMOL/L
APPEARANCE: CLEAR
AST SERPL-CCNC: 17 U/L
BASOPHILS # BLD AUTO: 0.09 K/UL
BASOPHILS NFR BLD AUTO: 1 %
BILIRUB SERPL-MCNC: <0.2 MG/DL
BILIRUBIN URINE: NEGATIVE
BLOOD URINE: ABNORMAL
BUN SERPL-MCNC: 12 MG/DL
C3 SERPL-MCNC: 148 MG/DL
C4 SERPL-MCNC: 37 MG/DL
CALCIUM SERPL-MCNC: 10.1 MG/DL
CHLORIDE SERPL-SCNC: 103 MMOL/L
CO2 SERPL-SCNC: 23 MMOL/L
COLOR: COLORLESS
CREAT SERPL-MCNC: 0.45 MG/DL
CREAT SPEC-SCNC: 15 MG/DL
CREAT/PROT UR: 1.2 RATIO
DSDNA AB SER-ACNC: 126 IU/ML
EGFR: 133 ML/MIN/1.73M2
EOSINOPHIL # BLD AUTO: 0.58 K/UL
EOSINOPHIL NFR BLD AUTO: 6.7 %
GLUCOSE QUALITATIVE U: NEGATIVE
GLUCOSE SERPL-MCNC: 107 MG/DL
HCT VFR BLD CALC: 47.7 %
HGB BLD-MCNC: 15.5 G/DL
IMM GRANULOCYTES NFR BLD AUTO: 0.5 %
KETONES URINE: NEGATIVE
LEUKOCYTE ESTERASE URINE: ABNORMAL
LYMPHOCYTES # BLD AUTO: 1.13 K/UL
LYMPHOCYTES NFR BLD AUTO: 13.1 %
MAN DIFF?: NORMAL
MCHC RBC-ENTMCNC: 27.6 PG
MCHC RBC-ENTMCNC: 32.5 GM/DL
MCV RBC AUTO: 84.9 FL
MONOCYTES # BLD AUTO: 0.84 K/UL
MONOCYTES NFR BLD AUTO: 9.7 %
NEUTROPHILS # BLD AUTO: 5.94 K/UL
NEUTROPHILS NFR BLD AUTO: 69 %
NITRITE URINE: NEGATIVE
PH URINE: 7
PLATELET # BLD AUTO: 416 K/UL
POTASSIUM SERPL-SCNC: 4.6 MMOL/L
PROT SERPL-MCNC: 7.6 G/DL
PROT UR-MCNC: 17 MG/DL
PROTEIN URINE: NORMAL
RBC # BLD: 5.62 M/UL
RBC # FLD: 13.1 %
SODIUM SERPL-SCNC: 139 MMOL/L
SPECIFIC GRAVITY URINE: 1
UROBILINOGEN URINE: NORMAL
WBC # FLD AUTO: 8.62 K/UL

## 2022-12-20 ENCOUNTER — APPOINTMENT (OUTPATIENT)
Dept: RHEUMATOLOGY | Facility: CLINIC | Age: 32
End: 2022-12-20

## 2022-12-20 ENCOUNTER — LABORATORY RESULT (OUTPATIENT)
Age: 32
End: 2022-12-20

## 2022-12-20 VITALS
HEIGHT: 57 IN | DIASTOLIC BLOOD PRESSURE: 84 MMHG | BODY MASS INDEX: 31.93 KG/M2 | OXYGEN SATURATION: 98 % | WEIGHT: 148 LBS | TEMPERATURE: 98.6 F | HEART RATE: 93 BPM | SYSTOLIC BLOOD PRESSURE: 128 MMHG

## 2022-12-20 PROCEDURE — 99214 OFFICE O/P EST MOD 30 MIN: CPT | Mod: 25

## 2022-12-20 NOTE — ASSESSMENT
[FreeTextEntry1] : 32 year old woman with longstanding history of lupus with lupus nephritis, followed by nephrologist at the [facility], follow up pending August 2023.  Patient currently doing well.  In May 2022, patient diagnosed with endometrial carcinoma, following at [facility], s/p surgery and had progesterone only IUD placed June 2022. Blood pressure well controlled, continue losartan 50 mg qday. Will check labs today in office. Patient will continue plaquenil 200 mg qday, up to date with ophthalmology follow up, continue close follow up with ophthalmology as recommended given long term Plaquenil use.  Patient will follow up in 6 months or sooner as needed.

## 2022-12-20 NOTE — PHYSICAL EXAM
[General Appearance - Alert] : alert [General Appearance - In No Acute Distress] : in no acute distress [General Appearance - Well-Appearing] : healthy appearing [Sclera] : the sclera and conjunctiva were normal [Examination Of The Oral Cavity] : the lips and gums were normal [Oropharynx] : the oropharynx was normal [Respiration, Rhythm And Depth] : normal respiratory rhythm and effort [Exaggerated Use Of Accessory Muscles For Inspiration] : no accessory muscle use [Auscultation Breath Sounds / Voice Sounds] : lungs were clear to auscultation bilaterally [Heart Rate And Rhythm] : heart rate was normal and rhythm regular [Heart Sounds] : normal S1 and S2 [Edema] : there was no peripheral edema [Abnormal Walk] : normal gait [Nail Clubbing] : no clubbing  or cyanosis of the fingernails [Musculoskeletal - Swelling] : no joint swelling seen [Motor Tone] : muscle strength and tone were normal [] : no rash [Skin Lesions] : no skin lesions [Oriented To Time, Place, And Person] : oriented to person, place, and time [Impaired Insight] : insight and judgment were intact [Affect] : the affect was normal [Mood] : the mood was normal [FreeTextEntry1] : No active synovitis of the upper and lower extremities bilaterally.  Muscle strength intact in all 4 extremities.

## 2022-12-20 NOTE — HISTORY OF PRESENT ILLNESS
[FreeTextEntry1] : 32 year old woman with long standing history of lupus, lupus nephritis, followed by nephrology at the [facility] [place] \par \par June 24, 2020\par Patient feeling well \par Last visit with nephrologist rescheduled due to current coronavirus pandemic.  She has appointment scheduled for next month. \par No changes in medications since last visit.  \par \par Vitals reviewed and stable\par No recent infections, feeling well.  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.\par No rashes, no alopecia.\par Exercising at home, walking for exercise but limited outside due to concern for risk of infection.\par \par No chest pain or shortness of breath\par no peripheral edema\par No Raynaud's\par Ophthalmology 11/18, will schedule follow up when current conditions allow, \par Continues plaquenil 200 mg qday, did not have issues with obtaining medication, has not missed doses.\par \par December 16, 2020\par Patient returns for follow up \par Patient feeling well\par No new symptoms\par Takes Vit D, C, iron\par No changes in medications since last visit\par No chest pain, shortness of breath, no peripheral edema\par No rashes, no Raynaud's\par No recent infections\par Not walking as much but will try to restart\par Walking less at work due wearing the mask\par \par June 17, 2021\par Patient feeling well\par Completed covid vaccine x 2, Moderna, March 24, 2021\par Parents completed vaccine as well in April 2021\par Had follow up with ophthalmology May 2021, no effects of plaquenil noted\par No changes in medications since last visit, overall feeling well\par Still exercising, walking, and you tube videos\par No chest pain or shortness of breath\par No joint pains\par No rashes\par +sunscreen use\par No Raynauds\par Taking vitamins C, D, and Iron as well\par No peripheral edema \par \par December 16, 2021\par Patient returns for follow up, feeling well\par Completed flu shot vaccine\par Completed covid vaccine x 2 doses in march 2021, discussed covid booster vaccine\par Completed follow up ophthalmology June 6, 2021\par Taking Vitamin C, D, iron supplement\par Doubled the dose of vitamin d as low at last visit, vitamin d 2000 units per day\par No joint pains or swelling, no stiffness\par No peripheral edema\par No rashes, no oral ulcers\par Overall feeling well, will have follow up with nephrologist in February\par \par June 16, 2022\par Patient returns for follow up\par Since last visit, patient has been diagnosed with endometrial carcinoma grade 1 following with gynecology oncology at [facility].\par Had IUD placed two days ago, progestin only, donn\par Following every six months with GYN oncology\par Was seen by nephrologist in February 2022, no changes in medications, has follow-up in August\par Feeling well, no joint pains or swelling, no stiffness, no peripheral edema, no rashes, no oral ulcers,\par No chest pain or shortness of breath.\par \par December 20, 2022\par Patient returns for follow up\par No changes in medications\par GYN follow up in February 2023\par No issues with IUD, reports having cyclical bleeding every 6 weeks\par Continues hydroxychloroquine 200 mg daily\par Losartan 50 mg qday\par Had URI for one week, covid negative, flu negative\par Had follow-up with [facility] in August 2022, changed to annual follow-up\par Feeling well, no joint pain or swelling, no stiffness, no peripheral edema, no rashes, no oral ulcers.\par Chest pain or shortness of breath\par Exercises mostly through walking as well as home video programs as does not feel ready to return to the gym \par Ophthalmology follow-up December 4, 2022, no evidence of Plaquenil toxicity

## 2022-12-21 LAB
ALBUMIN SERPL ELPH-MCNC: 4.6 G/DL
ALP BLD-CCNC: 112 U/L
ALT SERPL-CCNC: 22 U/L
ANION GAP SERPL CALC-SCNC: 11 MMOL/L
APPEARANCE: CLEAR
AST SERPL-CCNC: 19 U/L
BASOPHILS # BLD AUTO: 0.09 K/UL
BASOPHILS NFR BLD AUTO: 1.2 %
BILIRUB SERPL-MCNC: 0.3 MG/DL
BILIRUBIN URINE: NEGATIVE
BLOOD URINE: ABNORMAL
BUN SERPL-MCNC: 12 MG/DL
C3 SERPL-MCNC: 152 MG/DL
C4 SERPL-MCNC: 39 MG/DL
CALCIUM SERPL-MCNC: 10.3 MG/DL
CHLORIDE SERPL-SCNC: 103 MMOL/L
CO2 SERPL-SCNC: 26 MMOL/L
COLOR: COLORLESS
CREAT SERPL-MCNC: 0.42 MG/DL
CREAT SPEC-SCNC: 15 MG/DL
CREAT/PROT UR: 0.8 RATIO
EGFR: 135 ML/MIN/1.73M2
EOSINOPHIL # BLD AUTO: 0.45 K/UL
EOSINOPHIL NFR BLD AUTO: 6 %
GLUCOSE QUALITATIVE U: NEGATIVE
GLUCOSE SERPL-MCNC: 92 MG/DL
HCT VFR BLD CALC: 44.8 %
HGB BLD-MCNC: 14.9 G/DL
IMM GRANULOCYTES NFR BLD AUTO: 0.4 %
KETONES URINE: NEGATIVE
LEUKOCYTE ESTERASE URINE: NEGATIVE
LYMPHOCYTES # BLD AUTO: 0.96 K/UL
LYMPHOCYTES NFR BLD AUTO: 12.9 %
MAN DIFF?: NORMAL
MCHC RBC-ENTMCNC: 29.1 PG
MCHC RBC-ENTMCNC: 33.3 GM/DL
MCV RBC AUTO: 87.5 FL
MONOCYTES # BLD AUTO: 0.9 K/UL
MONOCYTES NFR BLD AUTO: 12.1 %
NEUTROPHILS # BLD AUTO: 5.02 K/UL
NEUTROPHILS NFR BLD AUTO: 67.4 %
NITRITE URINE: NEGATIVE
PH URINE: 6.5
PLATELET # BLD AUTO: 482 K/UL
POTASSIUM SERPL-SCNC: 4.5 MMOL/L
PROT SERPL-MCNC: 7.7 G/DL
PROT UR-MCNC: 11 MG/DL
PROTEIN URINE: NEGATIVE
RBC # BLD: 5.12 M/UL
RBC # FLD: 12.4 %
SODIUM SERPL-SCNC: 141 MMOL/L
SPECIFIC GRAVITY URINE: 1
UROBILINOGEN URINE: NORMAL
WBC # FLD AUTO: 7.45 K/UL

## 2022-12-22 LAB — DSDNA AB SER-ACNC: 42 IU/ML

## 2023-06-20 ENCOUNTER — LABORATORY RESULT (OUTPATIENT)
Age: 33
End: 2023-06-20

## 2023-06-20 ENCOUNTER — APPOINTMENT (OUTPATIENT)
Dept: RHEUMATOLOGY | Facility: CLINIC | Age: 33
End: 2023-06-20
Payer: COMMERCIAL

## 2023-06-20 VITALS
WEIGHT: 147.56 LBS | SYSTOLIC BLOOD PRESSURE: 126 MMHG | OXYGEN SATURATION: 94 % | BODY MASS INDEX: 31.83 KG/M2 | HEIGHT: 57 IN | HEART RATE: 94 BPM | DIASTOLIC BLOOD PRESSURE: 88 MMHG

## 2023-06-20 PROCEDURE — 36415 COLL VENOUS BLD VENIPUNCTURE: CPT

## 2023-06-20 PROCEDURE — 99214 OFFICE O/P EST MOD 30 MIN: CPT | Mod: 25

## 2023-06-20 NOTE — HISTORY OF PRESENT ILLNESS
[FreeTextEntry1] : 33 year old woman with long standing history of lupus, lupus nephritis, followed by nephrology at the [facility] [place] \par \par June 24, 2020\par Patient feeling well \par Last visit with nephrologist rescheduled due to current coronavirus pandemic.  She has appointment scheduled for next month. \par No changes in medications since last visit.  \par \par Vitals reviewed and stable\par No recent infections, feeling well.  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.\par No rashes, no alopecia.\par Exercising at home, walking for exercise but limited outside due to concern for risk of infection.\par \par No chest pain or shortness of breath\par no peripheral edema\par No Raynaud's\par Ophthalmology 11/18, will schedule follow up when current conditions allow, \par Continues plaquenil 200 mg qday, did not have issues with obtaining medication, has not missed doses.\par \par December 16, 2020\par Patient returns for follow up \par Patient feeling well\par No new symptoms\par Takes Vit D, C, iron\par No changes in medications since last visit\par No chest pain, shortness of breath, no peripheral edema\par No rashes, no Raynaud's\par No recent infections\par Not walking as much but will try to restart\par Walking less at work due wearing the mask\par \par June 17, 2021\par Patient feeling well\par Completed covid vaccine x 2, Moderna, March 24, 2021\par Parents completed vaccine as well in April 2021\par Had follow up with ophthalmology May 2021, no effects of plaquenil noted\par No changes in medications since last visit, overall feeling well\par Still exercising, walking, and you tube videos\par No chest pain or shortness of breath\par No joint pains\par No rashes\par +sunscreen use\par No Raynauds\par Taking vitamins C, D, and Iron as well\par No peripheral edema \par \par December 16, 2021\par Patient returns for follow up, feeling well\par Completed flu shot vaccine\par Completed covid vaccine x 2 doses in march 2021, discussed covid booster vaccine\par Completed follow up ophthalmology June 6, 2021\par Taking Vitamin C, D, iron supplement\par Doubled the dose of vitamin d as low at last visit, vitamin d 2000 units per day\par No joint pains or swelling, no stiffness\par No peripheral edema\par No rashes, no oral ulcers\par Overall feeling well, will have follow up with nephrologist in February\par \par June 16, 2022\par Patient returns for follow up\par Since last visit, patient has been diagnosed with endometrial carcinoma grade 1 following with gynecology oncology at [facility].\par Had IUD placed two days ago, progestin only, donn\par Following every six months with GYN oncology\par Was seen by nephrologist in February 2022, no changes in medications, has follow-up in August\par Feeling well, no joint pains or swelling, no stiffness, no peripheral edema, no rashes, no oral ulcers,\par No chest pain or shortness of breath.\par \par December 20, 2022\par Patient returns for follow up\par No changes in medications\par GYN follow up in February 2023\par No issues with IUD, reports having cyclical bleeding every 6 weeks\par Continues hydroxychloroquine 200 mg daily\par Losartan 50 mg qday\par Had URI for one week, covid negative, flu negative\par Had follow-up with [facility] in August 2022, changed to annual follow-up\par Feeling well, no joint pain or swelling, no stiffness, no peripheral edema, no rashes, no oral ulcers.\par Chest pain or shortness of breath\par Exercises mostly through walking as well as home video programs as does not feel ready to return to the gym \par Ophthalmology follow-up December 4, 2022, no evidence of Plaquenil toxicity\par \par June 20, 2023\par Patient returns for follow up\par Feeling well\par No new concerns this time\par Will see nephrologist in August 2023\par Continues hydroxychloroquine 200 mg daily\par Losartan 50 mg daily\par GYN in August 2023, was seen in February, repeated biopsy, still with some remnants, treated with progesterone\par IUD, will likely repeat in August  at [facility] in [place]\par \par

## 2023-06-20 NOTE — PHYSICAL EXAM
[General Appearance - Alert] : alert [General Appearance - In No Acute Distress] : in no acute distress [General Appearance - Well-Appearing] : healthy appearing [Sclera] : the sclera and conjunctiva were normal [Examination Of The Oral Cavity] : the lips and gums were normal [Oropharynx] : the oropharynx was normal [Respiration, Rhythm And Depth] : normal respiratory rhythm and effort [Exaggerated Use Of Accessory Muscles For Inspiration] : no accessory muscle use [Auscultation Breath Sounds / Voice Sounds] : lungs were clear to auscultation bilaterally [Edema] : there was no peripheral edema [Veins - Varicosity Changes] : there were no varicosital changes [No Spinal Tenderness] : no spinal tenderness [Abnormal Walk] : normal gait [Nail Clubbing] : no clubbing  or cyanosis of the fingernails [Musculoskeletal - Swelling] : no joint swelling seen [Motor Tone] : muscle strength and tone were normal [] : no rash [Skin Lesions] : no skin lesions [Oriented To Time, Place, And Person] : oriented to person, place, and time [Impaired Insight] : insight and judgment were intact [Affect] : the affect was normal [Mood] : the mood was normal

## 2023-06-20 NOTE — ASSESSMENT
[FreeTextEntry1] : 33 year old woman with longstanding history of lupus with lupus nephritis, followed by nephrologist at the [facility], follow up pending August 2023.  Patient currently doing well.  In May 2022, patient diagnosed with endometrial carcinoma, following at [facility], s/p surgery and had progesterone only IUD placed June 2022. Blood pressure well controlled, continue losartan 50 mg qday. Will check labs today in office. Patient will continue plaquenil 200 mg qday, up to date with ophthalmology follow up, continue close follow up with ophthalmology as recommended given long term Plaquenil use.  Patient will follow up in 6 months or sooner as needed.

## 2023-06-22 LAB
ALBUMIN SERPL ELPH-MCNC: 4.6 G/DL
ALP BLD-CCNC: 97 U/L
ALT SERPL-CCNC: 18 U/L
ANION GAP SERPL CALC-SCNC: 15 MMOL/L
APPEARANCE: CLEAR
AST SERPL-CCNC: 17 U/L
BILIRUB SERPL-MCNC: 0.3 MG/DL
BILIRUBIN URINE: NEGATIVE
BLOOD URINE: ABNORMAL
BUN SERPL-MCNC: 12 MG/DL
C3 SERPL-MCNC: 134 MG/DL
C4 SERPL-MCNC: 32 MG/DL
CALCIUM SERPL-MCNC: 9.7 MG/DL
CHLORIDE SERPL-SCNC: 103 MMOL/L
CHOLEST SERPL-MCNC: 179 MG/DL
CO2 SERPL-SCNC: 23 MMOL/L
COLOR: YELLOW
CREAT SERPL-MCNC: 0.43 MG/DL
CREAT SPEC-SCNC: 6 MG/DL
CREAT/PROT UR: 0.7 RATIO
DSDNA AB SER-ACNC: 185 IU/ML
EGFR: 133 ML/MIN/1.73M2
GLUCOSE QUALITATIVE U: NEGATIVE MG/DL
GLUCOSE SERPL-MCNC: 95 MG/DL
HDLC SERPL-MCNC: 53 MG/DL
KETONES URINE: NEGATIVE MG/DL
LDLC SERPL CALC-MCNC: 88 MG/DL
LEUKOCYTE ESTERASE URINE: ABNORMAL
NITRITE URINE: NEGATIVE
NONHDLC SERPL-MCNC: 127 MG/DL
PH URINE: 7
POTASSIUM SERPL-SCNC: 4.5 MMOL/L
PROT SERPL-MCNC: 7.2 G/DL
PROT UR-MCNC: 5 MG/DL
PROTEIN URINE: NEGATIVE MG/DL
SODIUM SERPL-SCNC: 141 MMOL/L
SPECIFIC GRAVITY URINE: 1
TRIGL SERPL-MCNC: 194 MG/DL
UROBILINOGEN URINE: 0.2 MG/DL

## 2023-07-14 ENCOUNTER — NON-APPOINTMENT (OUTPATIENT)
Age: 33
End: 2023-07-14

## 2023-07-17 ENCOUNTER — NON-APPOINTMENT (OUTPATIENT)
Age: 33
End: 2023-07-17

## 2023-07-17 ENCOUNTER — APPOINTMENT (OUTPATIENT)
Dept: HEART AND VASCULAR | Facility: CLINIC | Age: 33
End: 2023-07-17
Payer: COMMERCIAL

## 2023-07-17 VITALS
HEART RATE: 98 BPM | DIASTOLIC BLOOD PRESSURE: 75 MMHG | RESPIRATION RATE: 16 BRPM | SYSTOLIC BLOOD PRESSURE: 110 MMHG | OXYGEN SATURATION: 98 %

## 2023-07-17 VITALS — HEART RATE: 105 BPM | DIASTOLIC BLOOD PRESSURE: 86 MMHG | SYSTOLIC BLOOD PRESSURE: 141 MMHG | OXYGEN SATURATION: 97 %

## 2023-07-17 PROCEDURE — 99204 OFFICE O/P NEW MOD 45 MIN: CPT | Mod: 25

## 2023-07-17 PROCEDURE — 93000 ELECTROCARDIOGRAM COMPLETE: CPT | Mod: NC

## 2023-07-17 RX ORDER — LOSARTAN POTASSIUM 50 MG/1
50 TABLET, FILM COATED ORAL TWICE DAILY
Refills: 0 | Status: ACTIVE | COMMUNITY

## 2023-07-18 NOTE — REASON FOR VISIT
[Other: ____] : [unfilled] [Parent] : parent [FreeTextEntry1] : EKG:\par 07/17/2023: Sinus tachycardia, 102 bpm\par -------------------------------

## 2023-07-18 NOTE — DISCUSSION/SUMMARY
[EKG obtained to assist in diagnosis and management of assessed problem(s)] : EKG obtained to assist in diagnosis and management of assessed problem(s) [FreeTextEntry1] : All relevant risks and benefits discussed. Patient verbalizes understanding and agreement with plan.

## 2023-07-18 NOTE — ASSESSMENT
[FreeTextEntry1] : Pre-operative clearance for trimalleolar ankle fracture surgery: Rigo Glynn MD at [facility] \par (fax [phone])\par - Patient is scheduled for intermediate risk surgery and carries low preoperative cardiovascular risk \par - RCRI Class I, she has >4 METS functional capacity and has no acute cardiac complaints.\par - She may proceed to surgery without cardiac contraindications\par - She is not on any antiplatelet or anticoagulations. Discussed avoiding ASA prior to surgery.\par - Follow up in our office post procedure. \par \par HTN: BP at ACC/AHA 2017 guideline target \par -  Continue Losartan 50mg twice daily\par -  Counseled to limit dietary salt intake. \par - Will obtain TTE next visit to evaluate hypertensive heart disease\par \par Hypertriglyceridemia: , LDL 88, HDL 53\par - Discussed therapeutic lifestyle changes to promote improved lipid metabolism (low\par fat, low carbohydrate heart healthy diet, striving for optimal weight control, and aerobic exercises as tolerated)\par - Pt would like to try lifestyle modification prior to medications for now.\par - Will repeat labs next visit\par \par Lupus:\par - Continue Plaquenil 200mg daily\par - Follow up with Dr. Smith\par \par

## 2023-07-18 NOTE — PHYSICAL EXAM
[Well Developed] : well developed [Well Nourished] : well nourished [No Acute Distress] : no acute distress [Normal Conjunctiva] : normal conjunctiva [Normal Venous Pressure] : normal venous pressure [No Carotid Bruit] : no carotid bruit [Normal S1, S2] : normal S1, S2 [No Murmur] : no murmur [No Rub] : no rub [No Gallop] : no gallop [Clear Lung Fields] : clear lung fields [Good Air Entry] : good air entry [No Respiratory Distress] : no respiratory distress  [Soft] : abdomen soft [Non Tender] : non-tender [No Masses/organomegaly] : no masses/organomegaly [Normal Bowel Sounds] : normal bowel sounds [Normal Gait] : normal gait [No Cyanosis] : no cyanosis [No Edema] : no edema [No Clubbing] : no clubbing [No Rash] : no rash [No Varicosities] : no varicosities [No Skin Lesions] : no skin lesions [Moves all extremities] : moves all extremities [No Focal Deficits] : no focal deficits [Alert and Oriented] : alert and oriented [Normal Speech] : normal speech [Normal memory] : normal memory [de-identified] : right ankle/foot immobilized

## 2023-07-18 NOTE — HISTORY OF PRESENT ILLNESS
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 Last Tuesday she missed a step on stairs and sustained a displaced right trimalleolar fracture. Baseline ET: able to walk about 2-3 miles and able to climb up to 4-5 flights of stairs. Diet: Eats a healthy diet but does admit to eating weekend noodles/pasta. Denies CP, SOB/RETANA, palpitations, ANGELA dizziness and syncope.\par \par \par \par \par

## 2023-11-07 ENCOUNTER — APPOINTMENT (OUTPATIENT)
Dept: HEART AND VASCULAR | Facility: CLINIC | Age: 33
End: 2023-11-07
Payer: COMMERCIAL

## 2023-11-07 VITALS
DIASTOLIC BLOOD PRESSURE: 84 MMHG | HEIGHT: 57 IN | WEIGHT: 144 LBS | BODY MASS INDEX: 31.07 KG/M2 | OXYGEN SATURATION: 99 % | HEART RATE: 90 BPM | TEMPERATURE: 98.5 F | SYSTOLIC BLOOD PRESSURE: 135 MMHG

## 2023-11-07 DIAGNOSIS — E78.1 PURE HYPERGLYCERIDEMIA: ICD-10-CM

## 2023-11-07 DIAGNOSIS — Z86.79 PERSONAL HISTORY OF OTHER DISEASES OF THE CIRCULATORY SYSTEM: ICD-10-CM

## 2023-11-07 PROCEDURE — 99214 OFFICE O/P EST MOD 30 MIN: CPT

## 2023-11-24 ENCOUNTER — RX RENEWAL (OUTPATIENT)
Age: 33
End: 2023-11-24

## 2023-11-26 ENCOUNTER — RX RENEWAL (OUTPATIENT)
Age: 33
End: 2023-11-26

## 2023-11-26 RX ORDER — HYDROXYCHLOROQUINE SULFATE 200 MG/1
200 TABLET, FILM COATED ORAL DAILY
Qty: 90 | Refills: 3 | Status: ACTIVE | COMMUNITY
Start: 2020-01-29 | End: 1900-01-01

## 2023-12-11 ENCOUNTER — NON-APPOINTMENT (OUTPATIENT)
Age: 33
End: 2023-12-11

## 2023-12-12 ENCOUNTER — LABORATORY RESULT (OUTPATIENT)
Age: 33
End: 2023-12-12

## 2023-12-12 ENCOUNTER — APPOINTMENT (OUTPATIENT)
Dept: RHEUMATOLOGY | Facility: CLINIC | Age: 33
End: 2023-12-12
Payer: COMMERCIAL

## 2023-12-12 VITALS
OXYGEN SATURATION: 99 % | DIASTOLIC BLOOD PRESSURE: 87 MMHG | SYSTOLIC BLOOD PRESSURE: 132 MMHG | TEMPERATURE: 98.4 F | HEIGHT: 57 IN | HEART RATE: 90 BPM | WEIGHT: 141 LBS | BODY MASS INDEX: 30.42 KG/M2

## 2023-12-12 DIAGNOSIS — Z13.29 ENCOUNTER FOR SCREENING FOR OTHER SUSPECTED ENDOCRINE DISORDER: ICD-10-CM

## 2023-12-12 DIAGNOSIS — Z13.1 ENCOUNTER FOR SCREENING FOR DIABETES MELLITUS: ICD-10-CM

## 2023-12-12 PROCEDURE — 36415 COLL VENOUS BLD VENIPUNCTURE: CPT

## 2023-12-12 PROCEDURE — 99214 OFFICE O/P EST MOD 30 MIN: CPT | Mod: 25

## 2023-12-13 LAB
ALBUMIN SERPL ELPH-MCNC: 4.7 G/DL
ALP BLD-CCNC: 109 U/L
ALT SERPL-CCNC: 14 U/L
ANION GAP SERPL CALC-SCNC: 12 MMOL/L
APPEARANCE: CLEAR
AST SERPL-CCNC: 16 U/L
BASOPHILS # BLD AUTO: 0.13 K/UL
BASOPHILS NFR BLD AUTO: 1.8 %
BILIRUB SERPL-MCNC: 0.3 MG/DL
BILIRUBIN URINE: NEGATIVE
BLOOD URINE: ABNORMAL
BUN SERPL-MCNC: 14 MG/DL
C3 SERPL-MCNC: 127 MG/DL
C4 SERPL-MCNC: 33 MG/DL
CALCIUM SERPL-MCNC: 10.5 MG/DL
CHLORIDE SERPL-SCNC: 104 MMOL/L
CO2 SERPL-SCNC: 26 MMOL/L
COLOR: YELLOW
CREAT SERPL-MCNC: 0.46 MG/DL
CREAT SPEC-SCNC: 17 MG/DL
CREAT/PROT UR: 0.5 RATIO
EGFR: 131 ML/MIN/1.73M2
EOSINOPHIL # BLD AUTO: 0.45 K/UL
EOSINOPHIL NFR BLD AUTO: 6.3 %
ESTIMATED AVERAGE GLUCOSE: 108 MG/DL
GLUCOSE QUALITATIVE U: NEGATIVE MG/DL
GLUCOSE SERPL-MCNC: 95 MG/DL
HBA1C MFR BLD HPLC: 5.4 %
HCT VFR BLD CALC: 45.4 %
HGB BLD-MCNC: 14.7 G/DL
IMM GRANULOCYTES NFR BLD AUTO: 0.4 %
KETONES URINE: NEGATIVE MG/DL
LEUKOCYTE ESTERASE URINE: ABNORMAL
LYMPHOCYTES # BLD AUTO: 0.8 K/UL
LYMPHOCYTES NFR BLD AUTO: 11.3 %
MAN DIFF?: NORMAL
MCHC RBC-ENTMCNC: 29.2 PG
MCHC RBC-ENTMCNC: 32.4 GM/DL
MCV RBC AUTO: 90.3 FL
MONOCYTES # BLD AUTO: 0.59 K/UL
MONOCYTES NFR BLD AUTO: 8.3 %
NEUTROPHILS # BLD AUTO: 5.09 K/UL
NEUTROPHILS NFR BLD AUTO: 71.9 %
NITRITE URINE: NEGATIVE
PH URINE: 7.5
PLATELET # BLD AUTO: 469 K/UL
POTASSIUM SERPL-SCNC: 4.4 MMOL/L
PROT SERPL-MCNC: 7.5 G/DL
PROT UR-MCNC: 8 MG/DL
PROTEIN URINE: NEGATIVE MG/DL
RBC # BLD: 5.03 M/UL
RBC # FLD: 13.2 %
SODIUM SERPL-SCNC: 142 MMOL/L
SPECIFIC GRAVITY URINE: 1.01
TSH SERPL-ACNC: 0.76 UIU/ML
UROBILINOGEN URINE: 0.2 MG/DL
WBC # FLD AUTO: 7.09 K/UL

## 2023-12-14 LAB — DSDNA AB SER-ACNC: 101 IU/ML

## 2024-01-12 ENCOUNTER — APPOINTMENT (OUTPATIENT)
Dept: HEART AND VASCULAR | Facility: CLINIC | Age: 34
End: 2024-01-12

## 2024-03-15 ENCOUNTER — RESULT REVIEW (OUTPATIENT)
Age: 34
End: 2024-03-15

## 2024-03-15 ENCOUNTER — OUTPATIENT (OUTPATIENT)
Dept: OUTPATIENT SERVICES | Facility: HOSPITAL | Age: 34
LOS: 1 days | End: 2024-03-15
Payer: COMMERCIAL

## 2024-03-15 DIAGNOSIS — I10 ESSENTIAL (PRIMARY) HYPERTENSION: ICD-10-CM

## 2024-03-15 PROCEDURE — 93306 TTE W/DOPPLER COMPLETE: CPT

## 2024-03-15 PROCEDURE — 93306 TTE W/DOPPLER COMPLETE: CPT | Mod: 26

## 2024-05-14 ENCOUNTER — APPOINTMENT (OUTPATIENT)
Dept: HEART AND VASCULAR | Facility: CLINIC | Age: 34
End: 2024-05-14

## 2024-06-05 ENCOUNTER — LABORATORY RESULT (OUTPATIENT)
Age: 34
End: 2024-06-05

## 2024-06-05 ENCOUNTER — APPOINTMENT (OUTPATIENT)
Dept: RHEUMATOLOGY | Facility: CLINIC | Age: 34
End: 2024-06-05
Payer: COMMERCIAL

## 2024-06-05 VITALS
SYSTOLIC BLOOD PRESSURE: 126 MMHG | BODY MASS INDEX: 28.91 KG/M2 | HEART RATE: 99 BPM | OXYGEN SATURATION: 99 % | WEIGHT: 134 LBS | DIASTOLIC BLOOD PRESSURE: 80 MMHG | TEMPERATURE: 97.8 F | HEIGHT: 57 IN

## 2024-06-05 DIAGNOSIS — M32.9 SYSTEMIC LUPUS ERYTHEMATOSUS, UNSPECIFIED: ICD-10-CM

## 2024-06-05 DIAGNOSIS — Z87.39 PERSONAL HISTORY OF OTHER DISEASES OF THE MUSCULOSKELETAL SYSTEM AND CONNECTIVE TISSUE: ICD-10-CM

## 2024-06-05 DIAGNOSIS — I10 ESSENTIAL (PRIMARY) HYPERTENSION: ICD-10-CM

## 2024-06-05 PROCEDURE — 36415 COLL VENOUS BLD VENIPUNCTURE: CPT

## 2024-06-05 PROCEDURE — G2211 COMPLEX E/M VISIT ADD ON: CPT

## 2024-06-05 PROCEDURE — 99214 OFFICE O/P EST MOD 30 MIN: CPT

## 2024-06-05 NOTE — HISTORY OF PRESENT ILLNESS
[FreeTextEntry1] : June 5th 2024 Patient returns for follow up of SLE Patient overall feeling well, no new concerns today Patient continues losartan 50 bid, continues plaquenil 200 mg qday Patient will make appointment for follow up [facility] ophthalmologist, last visit last summer, follows annually  Patient with weight loss, intentional, exercises three times a week, walks daily Continues to follow closely with gynecologist twice a year, biopsy Follow up with nephrologist at [place] now annually, no urinary symptoms at this time  ---------------------- 33 year old woman with long standing history of lupus, lupus nephritis, followed by nephrology at the [facility] [place]   June 24, 2020 Patient feeling well  Last visit with nephrologist rescheduled due to current coronavirus pandemic.  She has appointment scheduled for next month.  No changes in medications since last visit.    Vitals reviewed and stable No recent infections, feeling well.  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 No rashes, no alopecia. Exercising at home, walking for exercise but limited outside due to concern for risk of infection.  No chest pain or shortness of breath no peripheral edema No Raynaud's Ophthalmology 11/18, will schedule follow up when current conditions allow,  Continues plaquenil 200 mg qday, did not have issues with obtaining medication, has not missed doses.  December 16, 2020 Patient returns for follow up  Patient feeling well No new symptoms Takes Vit D, C, iron No changes in medications since last visit No chest pain, shortness of breath, no peripheral edema No rashes, no Raynaud's No recent infections Not walking as much but will try to restart Walking less at work due wearing the mask  June 17, 2021 Patient feeling well Completed covid vaccine x 2, Moderna, March 24, 2021 Parents completed vaccine as well in April 2021 Had follow up with ophthalmology May 2021, no effects of plaquenil noted No changes in medications since last visit, overall feeling well Still exercising, walking, and you tube videos No chest pain or shortness of breath No joint pains No rashes +sunscreen use No Raynauds Taking vitamins C, D, and Iron as well No peripheral edema   December 16, 2021 Patient returns for follow up, feeling well Completed flu shot vaccine Completed covid vaccine x 2 doses in march 2021, discussed covid booster vaccine Completed follow up ophthalmology June 6, 2021 Taking Vitamin C, D, iron supplement Doubled the dose of vitamin d as low at last visit, vitamin d 2000 units per day No joint pains or swelling, no stiffness No peripheral edema No rashes, no oral ulcers Overall feeling well, will have follow up with nephrologist in February June 16, 2022 Patient returns for follow up Since last visit, patient has been diagnosed with endometrial carcinoma grade 1 following with gynecology oncology at [facility]. Had IUD placed two days ago, progestin only, donn Following every six months with GYN oncology Was seen by nephrologist in February 2022, no changes in medications, has follow-up in August Feeling well, no joint pains or swelling, no stiffness, no peripheral edema, no rashes, no oral ulcers, No chest pain or shortness of breath.  December 20, 2022 Patient returns for follow up No changes in medications GYN follow up in February 2023 No issues with IUD, reports having cyclical bleeding every 6 weeks Continues hydroxychloroquine 200 mg daily Losartan 50 mg qday Had URI for one week, covid negative, flu negative Had follow-up with [facility] in August 2022, changed to annual follow-up Feeling well, no joint pain or swelling, no stiffness, no peripheral edema, no rashes, no oral ulcers. Chest pain or shortness of breath Exercises mostly through walking as well as home video programs as does not feel ready to return to the gym  Ophthalmology follow-up December 4, 2022, no evidence of Plaquenil toxicity  December 12, 2023 Patient returns for follow up of SLE Patient is feeling generally well No joint pains No rashes No oral ulcers No peripheral edema No urinary symptoms Patient is currently taking hydroxychloroquine 200 mg qday Since last visit evaluated by cardiologist, planning for echo Evaluated by Nephrologist at the end of August 2023, following up annually now Evaluated by GYN in August, biopsy normal, following up every 6 months Ophthalmology evaluation on Sunday Patient working on weight loss

## 2024-06-05 NOTE — END OF VISIT
[Time Spent: ___ minutes] : I have spent [unfilled] minutes of time on the encounter. [FreeTextEntry3] : All medical record entries made by the Scribe were at my, Dr. Ofelia Smith MD, direction and personally dictated by me on 12/12/2023. I have reviewed the chart and agree that the record accurately reflects my personal performance of the history, physical exam, assessment and plan. I have also personally directed, reviewed, and agreed with the chart.

## 2024-06-05 NOTE — PHYSICAL EXAM
[General Appearance - Alert] : alert [General Appearance - In No Acute Distress] : in no acute distress [General Appearance - Well Nourished] : well nourished [General Appearance - Well Developed] : well developed [General Appearance - Well-Appearing] : healthy appearing [Sclera] : the sclera and conjunctiva were normal [Examination Of The Oral Cavity] : the lips and gums were normal [Oropharynx] : the oropharynx was normal [Respiration, Rhythm And Depth] : normal respiratory rhythm and effort [Exaggerated Use Of Accessory Muscles For Inspiration] : no accessory muscle use [Auscultation Breath Sounds / Voice Sounds] : lungs were clear to auscultation bilaterally [Heart Rate And Rhythm] : heart rate was normal and rhythm regular [Heart Sounds] : normal S1 and S2 [Murmurs] : no murmurs [Edema] : there was no peripheral edema [] : no rash [Oriented To Time, Place, And Person] : oriented to person, place, and time [Impaired Insight] : insight and judgment were intact [Affect] : the affect was normal [Mood] : the mood was normal [Abnormal Walk] : normal gait [Nail Clubbing] : no clubbing  or cyanosis of the fingernails [Musculoskeletal - Swelling] : no joint swelling seen [Motor Tone] : muscle strength and tone were normal [FreeTextEntry1] : No active synovitis of the upper and lower extremities bilaterally.  Muscle strength intact in all four extremities.

## 2024-06-05 NOTE — ADDENDUM
[FreeTextEntry1] : I, Trevor Brar, documented this note as a scribe on behalf of Dr. Ofelia Smith MD on 12/12/2023.

## 2024-06-05 NOTE — ASSESSMENT
[FreeTextEntry1] : 34 year old woman with longstanding history of lupus with lupus nephritis, followed by nephrologist at the [facility], follow up pending August 2024. Patient currently doing well, no new symptoms or concerns at this time. In May 2022, patient diagnosed with endometrial carcinoma, following at [facility], s/p surgery and had progesterone only IUD placed June 2022. Following up with GYN every six months, recent biopsy normal.  Blood pressure well controlled, continue losartan 50 mg bid. Patient will continue plaquenil 200 mg qday, continue close follow up with ophthalmology as recommended given chronic Plaquenil use, will make follow up appointment for this summer as recommended. Blood work will be updated in office today, including C3, C4, CBC, CMP, dsDNA and protein/creatine ratio. Sunscreen use discussed. Patient will follow up in 6 months or sooner as needed.

## 2024-06-06 LAB
ALBUMIN SERPL ELPH-MCNC: 4.7 G/DL
ALP BLD-CCNC: 114 U/L
ALT SERPL-CCNC: 18 U/L
ANION GAP SERPL CALC-SCNC: 15 MMOL/L
APPEARANCE: CLEAR
AST SERPL-CCNC: 18 U/L
BASOPHILS # BLD AUTO: 0.1 K/UL
BASOPHILS NFR BLD AUTO: 1.5 %
BILIRUB SERPL-MCNC: 0.5 MG/DL
BILIRUBIN URINE: NEGATIVE
BLOOD URINE: NEGATIVE
BUN SERPL-MCNC: 11 MG/DL
C3 SERPL-MCNC: 135 MG/DL
C4 SERPL-MCNC: 33 MG/DL
CALCIUM SERPL-MCNC: 9.9 MG/DL
CHLORIDE SERPL-SCNC: 102 MMOL/L
CO2 SERPL-SCNC: 21 MMOL/L
COLOR: YELLOW
CREAT SERPL-MCNC: 0.46 MG/DL
CREAT SPEC-SCNC: 11 MG/DL
CREAT/PROT UR: 0.4 RATIO
DSDNA AB SER-ACNC: 3 IU/ML
EGFR: 130 ML/MIN/1.73M2
EOSINOPHIL # BLD AUTO: 0.39 K/UL
EOSINOPHIL NFR BLD AUTO: 5.9 %
GLUCOSE QUALITATIVE U: NEGATIVE MG/DL
GLUCOSE SERPL-MCNC: 79 MG/DL
HCT VFR BLD CALC: 43.9 %
HGB BLD-MCNC: 14.8 G/DL
IMM GRANULOCYTES NFR BLD AUTO: 0.3 %
KETONES URINE: NEGATIVE MG/DL
LEUKOCYTE ESTERASE URINE: ABNORMAL
LYMPHOCYTES # BLD AUTO: 0.96 K/UL
LYMPHOCYTES NFR BLD AUTO: 14.6 %
MAN DIFF?: NORMAL
MCHC RBC-ENTMCNC: 29.8 PG
MCHC RBC-ENTMCNC: 33.7 GM/DL
MCV RBC AUTO: 88.3 FL
MONOCYTES # BLD AUTO: 0.84 K/UL
MONOCYTES NFR BLD AUTO: 12.8 %
NEUTROPHILS # BLD AUTO: 4.26 K/UL
NEUTROPHILS NFR BLD AUTO: 64.9 %
NITRITE URINE: NEGATIVE
PH URINE: 7
PLATELET # BLD AUTO: 450 K/UL
POTASSIUM SERPL-SCNC: 4.4 MMOL/L
PROT SERPL-MCNC: 8 G/DL
PROT UR-MCNC: 5 MG/DL
PROTEIN URINE: NEGATIVE MG/DL
RBC # BLD: 4.97 M/UL
RBC # FLD: 13.2 %
SODIUM SERPL-SCNC: 138 MMOL/L
SPECIFIC GRAVITY URINE: <1.005
UROBILINOGEN URINE: 0.2 MG/DL
WBC # FLD AUTO: 6.57 K/UL

## 2024-11-04 ENCOUNTER — RX RENEWAL (OUTPATIENT)
Age: 34
End: 2024-11-04

## 2024-11-04 DIAGNOSIS — M32.19 OTHER ORGAN OR SYSTEM INVOLVEMENT IN SYSTEMIC LUPUS ERYTHEMATOSUS: ICD-10-CM

## 2024-11-13 ENCOUNTER — TRANSCRIPTION ENCOUNTER (OUTPATIENT)
Age: 34
End: 2024-11-13

## 2024-12-04 ENCOUNTER — NON-APPOINTMENT (OUTPATIENT)
Age: 34
End: 2024-12-04

## 2024-12-04 ENCOUNTER — APPOINTMENT (OUTPATIENT)
Dept: RHEUMATOLOGY | Facility: CLINIC | Age: 34
End: 2024-12-04
Payer: COMMERCIAL

## 2024-12-04 VITALS
TEMPERATURE: 98.2 F | HEART RATE: 93 BPM | BODY MASS INDEX: 27.61 KG/M2 | WEIGHT: 128 LBS | HEIGHT: 57 IN | DIASTOLIC BLOOD PRESSURE: 83 MMHG | SYSTOLIC BLOOD PRESSURE: 127 MMHG | OXYGEN SATURATION: 94 %

## 2024-12-04 DIAGNOSIS — Z87.39 PERSONAL HISTORY OF OTHER DISEASES OF THE MUSCULOSKELETAL SYSTEM AND CONNECTIVE TISSUE: ICD-10-CM

## 2024-12-04 DIAGNOSIS — M32.19 OTHER ORGAN OR SYSTEM INVOLVEMENT IN SYSTEMIC LUPUS ERYTHEMATOSUS: ICD-10-CM

## 2024-12-04 DIAGNOSIS — R79.89 OTHER SPECIFIED ABNORMAL FINDINGS OF BLOOD CHEMISTRY: ICD-10-CM

## 2024-12-04 DIAGNOSIS — I10 ESSENTIAL (PRIMARY) HYPERTENSION: ICD-10-CM

## 2024-12-04 PROCEDURE — 99214 OFFICE O/P EST MOD 30 MIN: CPT

## 2024-12-04 PROCEDURE — 36415 COLL VENOUS BLD VENIPUNCTURE: CPT

## 2024-12-04 PROCEDURE — G2211 COMPLEX E/M VISIT ADD ON: CPT

## 2024-12-05 LAB
25(OH)D3 SERPL-MCNC: 55 NG/ML
ALBUMIN SERPL ELPH-MCNC: 4.5 G/DL
ALP BLD-CCNC: 124 U/L
ALT SERPL-CCNC: 15 U/L
ANION GAP SERPL CALC-SCNC: 15 MMOL/L
APPEARANCE: CLEAR
AST SERPL-CCNC: 17 U/L
BASOPHILS # BLD AUTO: 0.09 K/UL
BASOPHILS NFR BLD AUTO: 1.3 %
BILIRUB SERPL-MCNC: 0.4 MG/DL
BILIRUBIN URINE: NEGATIVE
BLOOD URINE: NEGATIVE
BUN SERPL-MCNC: 14 MG/DL
C3 SERPL-MCNC: 142 MG/DL
C4 SERPL-MCNC: 30 MG/DL
CALCIUM SERPL-MCNC: 10.2 MG/DL
CHLORIDE SERPL-SCNC: 102 MMOL/L
CO2 SERPL-SCNC: 23 MMOL/L
COLOR: YELLOW
CREAT SERPL-MCNC: 0.5 MG/DL
CREAT SPEC-SCNC: 14 MG/DL
CREAT/PROT UR: 0.4 RATIO
DSDNA AB SER-ACNC: 2 IU/ML
EGFR: 128 ML/MIN/1.73M2
EOSINOPHIL # BLD AUTO: 0.45 K/UL
EOSINOPHIL NFR BLD AUTO: 6.6 %
GLUCOSE QUALITATIVE U: NEGATIVE MG/DL
GLUCOSE SERPL-MCNC: 73 MG/DL
HCT VFR BLD CALC: 48.5 %
HGB BLD-MCNC: 14.8 G/DL
IMM GRANULOCYTES NFR BLD AUTO: 0.4 %
KETONES URINE: NEGATIVE MG/DL
LEUKOCYTE ESTERASE URINE: NEGATIVE
LYMPHOCYTES # BLD AUTO: 0.75 K/UL
LYMPHOCYTES NFR BLD AUTO: 11.1 %
MAN DIFF?: NORMAL
MCHC RBC-ENTMCNC: 28.8 PG
MCHC RBC-ENTMCNC: 30.5 G/DL
MCV RBC AUTO: 94.5 FL
MONOCYTES # BLD AUTO: 0.69 K/UL
MONOCYTES NFR BLD AUTO: 10.2 %
NEUTROPHILS # BLD AUTO: 4.76 K/UL
NEUTROPHILS NFR BLD AUTO: 70.4 %
NITRITE URINE: NEGATIVE
PH URINE: 7
PLATELET # BLD AUTO: 398 K/UL
POTASSIUM SERPL-SCNC: 4.4 MMOL/L
PROT SERPL-MCNC: 7.8 G/DL
PROT UR-MCNC: 5 MG/DL
PROTEIN URINE: NEGATIVE MG/DL
RBC # BLD: 5.13 M/UL
RBC # FLD: 13.5 %
SODIUM SERPL-SCNC: 140 MMOL/L
SPECIFIC GRAVITY URINE: 1.01
UROBILINOGEN URINE: 0.2 MG/DL
WBC # FLD AUTO: 6.77 K/UL

## 2025-06-16 ENCOUNTER — NON-APPOINTMENT (OUTPATIENT)
Age: 35
End: 2025-06-16

## 2025-06-18 ENCOUNTER — APPOINTMENT (OUTPATIENT)
Dept: RHEUMATOLOGY | Facility: CLINIC | Age: 35
End: 2025-06-18
Payer: COMMERCIAL

## 2025-06-18 VITALS
BODY MASS INDEX: 27.83 KG/M2 | WEIGHT: 129 LBS | DIASTOLIC BLOOD PRESSURE: 79 MMHG | OXYGEN SATURATION: 98 % | HEIGHT: 57 IN | HEART RATE: 96 BPM | TEMPERATURE: 97.3 F | SYSTOLIC BLOOD PRESSURE: 114 MMHG

## 2025-06-18 PROCEDURE — 99215 OFFICE O/P EST HI 40 MIN: CPT

## 2025-06-18 PROCEDURE — G2211 COMPLEX E/M VISIT ADD ON: CPT

## 2025-06-18 PROCEDURE — 36415 COLL VENOUS BLD VENIPUNCTURE: CPT

## 2025-06-19 LAB
25(OH)D3 SERPL-MCNC: 56.8 NG/ML
ALBUMIN SERPL ELPH-MCNC: 4.5 G/DL
ALP BLD-CCNC: 110 U/L
ALT SERPL-CCNC: 19 U/L
ANION GAP SERPL CALC-SCNC: 17 MMOL/L
AST SERPL-CCNC: 18 U/L
BASOPHILS # BLD AUTO: 0.09 K/UL
BASOPHILS NFR BLD AUTO: 1.2 %
BILIRUB SERPL-MCNC: 0.4 MG/DL
BUN SERPL-MCNC: 13 MG/DL
C3 SERPL-MCNC: 110 MG/DL
C4 SERPL-MCNC: 28 MG/DL
CALCIUM SERPL-MCNC: 10.4 MG/DL
CHLORIDE SERPL-SCNC: 103 MMOL/L
CHOLEST SERPL-MCNC: 182 MG/DL
CO2 SERPL-SCNC: 20 MMOL/L
CREAT SERPL-MCNC: 0.45 MG/DL
CREAT SPEC-SCNC: 14 MG/DL
CREAT/PROT UR: 0.6 RATIO
EGFRCR SERPLBLD CKD-EPI 2021: 130 ML/MIN/1.73M2
EOSINOPHIL # BLD AUTO: 0.42 K/UL
EOSINOPHIL NFR BLD AUTO: 5.7 %
ESTIMATED AVERAGE GLUCOSE: 105 MG/DL
GLUCOSE SERPL-MCNC: 80 MG/DL
HBA1C MFR BLD HPLC: 5.3 %
HCT VFR BLD CALC: 46.1 %
HDLC SERPL-MCNC: 67 MG/DL
HGB BLD-MCNC: 14.6 G/DL
IMM GRANULOCYTES NFR BLD AUTO: 0.5 %
LDLC SERPL-MCNC: 101 MG/DL
LYMPHOCYTES # BLD AUTO: 0.68 K/UL
LYMPHOCYTES NFR BLD AUTO: 9.3 %
MAN DIFF?: NORMAL
MCHC RBC-ENTMCNC: 29.6 PG
MCHC RBC-ENTMCNC: 31.7 G/DL
MCV RBC AUTO: 93.5 FL
MONOCYTES # BLD AUTO: 0.9 K/UL
MONOCYTES NFR BLD AUTO: 12.3 %
NEUTROPHILS # BLD AUTO: 5.19 K/UL
NEUTROPHILS NFR BLD AUTO: 71 %
NONHDLC SERPL-MCNC: 116 MG/DL
PLATELET # BLD AUTO: 371 K/UL
POTASSIUM SERPL-SCNC: 4.8 MMOL/L
PROT SERPL-MCNC: 7.5 G/DL
PROT UR-MCNC: 9 MG/DL
RBC # BLD: 4.93 M/UL
RBC # FLD: 13.7 %
SODIUM SERPL-SCNC: 141 MMOL/L
TRIGL SERPL-MCNC: 81 MG/DL
TSH SERPL-ACNC: 0.81 UIU/ML
WBC # FLD AUTO: 7.32 K/UL

## 2025-06-20 LAB — DSDNA AB SER-ACNC: 2 IU/ML

## 2025-07-22 ENCOUNTER — TRANSCRIPTION ENCOUNTER (OUTPATIENT)
Age: 35
End: 2025-07-22

## 2025-07-23 ENCOUNTER — TRANSCRIPTION ENCOUNTER (OUTPATIENT)
Age: 35
End: 2025-07-23